# Patient Record
Sex: MALE | Race: WHITE | NOT HISPANIC OR LATINO | Employment: UNEMPLOYED | URBAN - METROPOLITAN AREA
[De-identification: names, ages, dates, MRNs, and addresses within clinical notes are randomized per-mention and may not be internally consistent; named-entity substitution may affect disease eponyms.]

---

## 2021-01-19 RX ORDER — DEXTROAMPHETAMINE SACCHARATE, AMPHETAMINE ASPARTATE MONOHYDRATE, DEXTROAMPHETAMINE SULFATE AND AMPHETAMINE SULFATE 7.5; 7.5; 7.5; 7.5 MG/1; MG/1; MG/1; MG/1
30 CAPSULE, EXTENDED RELEASE ORAL DAILY
COMMUNITY
End: 2021-01-20

## 2021-01-19 RX ORDER — ROPINIROLE 1 MG/1
1 TABLET, FILM COATED ORAL 3 TIMES DAILY
COMMUNITY
End: 2021-01-20

## 2021-01-19 RX ORDER — FLUOXETINE 40 MG/1
40 CAPSULE ORAL DAILY
COMMUNITY
End: 2021-01-20

## 2021-01-19 RX ORDER — DEXTROAMPHETAMINE SACCHARATE, AMPHETAMINE ASPARTATE, DEXTROAMPHETAMINE SULFATE AND AMPHETAMINE SULFATE 2.5; 2.5; 2.5; 2.5 MG/1; MG/1; MG/1; MG/1
10 TABLET ORAL 2 TIMES DAILY
COMMUNITY
End: 2021-01-20

## 2021-01-19 NOTE — PROGRESS NOTES
3  SUBJECTIVE:   CC: Tanvir Munoz is an 21 year old male who presents for preventive health visit.     Patient has been advised of split billing requirements and indicates understanding: Yes  Healthy Habits:    Do you get at least three servings of calcium containing foods daily (dairy, green leafy vegetables, etc.)? yes    Amount of exercise or daily activities, outside of work: 2-3 day(s) per week    Problems taking medications regularly No    Medication side effects: No    Have you had an eye exam in the past two years? yes    Do you see a dentist twice per year? yes    Do you have sleep apnea, excessive snoring or daytime drowsiness?yes - diagnosed pre-sleep apnea, will be trying a mouth guard. Sleep specialty with [facility]    PROBLEMS TO ADD ON...  Fasting    Needing refills and/or labs for:  Depression/Anxiety  Update PHQ/JOSEPH    Additional medications:  Adderall  Adderall XR     Seeing a sleep specialist for mild sleep apnea and RLS  Will be trying out a mouth guard and has been started on Requip    -------------------------------------    Today's PHQ-2 Score:   PHQ-2 ( 1999 Pfizer) 1/19/2021   Q1: Little interest or pleasure in doing things 0   Q2: Feeling down, depressed or hopeless 0   PHQ-2 Score 0       Abuse: Current or Past(Physical, Sexual or Emotional)- No  Do you feel safe in your environment? Yes        Social History     Tobacco Use     Smoking status: Never Smoker     Smokeless tobacco: Never Used   Substance Use Topics     Alcohol use: No     If you drink alcohol do you typically have >3 drinks per day or >7 drinks per week? No                      Last PSA: No results found for: PSA    Reviewed orders with patient. Reviewed health maintenance and updated orders accordingly - Yes  Lab work is in process    Reviewed and updated as needed this visit by clinical staff  Tobacco  Allergies  Meds              Reviewed and updated as needed this visit by Provider                Past Medical History:  "  Diagnosis Date     Pneumonia 6/09    HOSP.2 DAYS        ROS:  CONSTITUTIONAL: NEGATIVE for fever, chills, change in weight  INTEGUMENTARY/SKIN: NEGATIVE for worrisome rashes, moles or lesions  EYES: NEGATIVE for vision changes or irritation  ENT: NEGATIVE for ear, mouth and throat problems  RESP: NEGATIVE for significant cough or SOB  CV: NEGATIVE for chest pain, palpitations or peripheral edema  GI: NEGATIVE for nausea, abdominal pain, heartburn, or change in bowel habits   male: negative for dysuria, hematuria, decreased urinary stream, erectile dysfunction, urethral discharge  MUSCULOSKELETAL: NEGATIVE for significant arthralgias or myalgia  NEURO: NEGATIVE for weakness, dizziness or paresthesias  PSYCHIATRIC: NEGATIVE for changes in mood or affect    OBJECTIVE:   /81   Pulse 55   Temp 97.2  F (36.2  C) (Tympanic)   Resp 16   Ht 1.803 m (5' 10.98\")   Wt 94.5 kg (208 lb 6.4 oz)   SpO2 98%   BMI 29.08 kg/m    EXAM:  GENERAL: healthy, alert and no distress  EYES: Eyes grossly normal to inspection, PERRL and conjunctivae and sclerae normal  HENT: ear canals and TM's normal, nose and mouth without ulcers or lesions  NECK: no adenopathy, no asymmetry, masses, or scars and thyroid normal to palpation  RESP: lungs clear to auscultation - no rales, rhonchi or wheezes  CV: regular rates and rhythm, normal S1 S2, no S3 or S4 and no murmur, click or rub  ABDOMEN: soft, nontender, no hepatosplenomegaly, no masses and bowel sounds normal  MS: no gross musculoskeletal defects noted, no edema  SKIN: no suspicious lesions or rashes  NEURO: Normal strength and tone, mentation intact and speech normal  PSYCH: mentation appears normal, affect normal/bright    Diagnostic Test Results:  Labs reviewed in Epic    ASSESSMENT/PLAN:     1. Encounter for routine adult medical exam with abnormal findings    2. Lipid screening    3. Diabetes mellitus screening    4. Encounter for screening for HIV    5. Need for hepatitis C " "screening test    6. JOSEPH (generalized anxiety disorder)    7. Attention deficit hyperactivity disorder (ADHD), unspecified ADHD type    8. Hyperhidrosis of axilla    9. Heat intolerance    10. Sleep apnea, unspecified type    11. RLS (restless legs syndrome)        1-5) Screenings discussed    6,7) Meds renewed, no changes. Conditions stable per patient. UDS at next OV then annually.    8,9) Drysol renewed. Will check a CBC and TSH as well.     10,11) Currently following sleep specialty.       Patient has been advised of split billing requirements and indicates understanding: Yes  COUNSELING:  Reviewed preventive health counseling, as reflected in patient instructions    Estimated body mass index is 29.08 kg/m  as calculated from the following:    Height as of this encounter: 1.803 m (5' 10.98\").    Weight as of this encounter: 94.5 kg (208 lb 6.4 oz).    He reports that he has never smoked. He has never used smokeless tobacco.      Counseling Resources:  ATP IV Guidelines  Pooled Cohorts Equation Calculator  FRAX Risk Assessment  ICSI Preventive Guidelines  Dietary Guidelines for Americans, 2010  USDA's MyPlate  ASA Prophylaxis  Lung CA Screening    LUZ Collins [facility]  "

## 2021-01-20 ENCOUNTER — OFFICE VISIT (OUTPATIENT)
Dept: FAMILY MEDICINE | Facility: CLINIC | Age: 21
End: 2021-01-20
Payer: COMMERCIAL

## 2021-01-20 VITALS
WEIGHT: 208.4 LBS | SYSTOLIC BLOOD PRESSURE: 131 MMHG | DIASTOLIC BLOOD PRESSURE: 81 MMHG | OXYGEN SATURATION: 98 % | BODY MASS INDEX: 29.18 KG/M2 | HEIGHT: 71 IN | HEART RATE: 55 BPM | TEMPERATURE: 97.2 F | RESPIRATION RATE: 16 BRPM

## 2021-01-20 DIAGNOSIS — G47.30 SLEEP APNEA, UNSPECIFIED TYPE: ICD-10-CM

## 2021-01-20 DIAGNOSIS — F90.9 ATTENTION DEFICIT HYPERACTIVITY DISORDER (ADHD), UNSPECIFIED ADHD TYPE: ICD-10-CM

## 2021-01-20 DIAGNOSIS — Z11.59 NEED FOR HEPATITIS C SCREENING TEST: ICD-10-CM

## 2021-01-20 DIAGNOSIS — Z13.1 DIABETES MELLITUS SCREENING: ICD-10-CM

## 2021-01-20 DIAGNOSIS — Z11.4 ENCOUNTER FOR SCREENING FOR HIV: ICD-10-CM

## 2021-01-20 DIAGNOSIS — Z13.220 LIPID SCREENING: ICD-10-CM

## 2021-01-20 DIAGNOSIS — R68.89 HEAT INTOLERANCE: ICD-10-CM

## 2021-01-20 DIAGNOSIS — F41.1 GAD (GENERALIZED ANXIETY DISORDER): ICD-10-CM

## 2021-01-20 DIAGNOSIS — L74.510 HYPERHIDROSIS OF AXILLA: ICD-10-CM

## 2021-01-20 DIAGNOSIS — Z00.01 ENCOUNTER FOR ROUTINE ADULT MEDICAL EXAM WITH ABNORMAL FINDINGS: Primary | ICD-10-CM

## 2021-01-20 DIAGNOSIS — G25.81 RLS (RESTLESS LEGS SYNDROME): ICD-10-CM

## 2021-01-20 LAB
BASOPHILS # BLD AUTO: 0 10E9/L (ref 0–0.2)
BASOPHILS NFR BLD AUTO: 0.4 %
CHOLEST SERPL-MCNC: 165 MG/DL
DIFFERENTIAL METHOD BLD: NORMAL
EOSINOPHIL # BLD AUTO: 0.1 10E9/L (ref 0–0.7)
EOSINOPHIL NFR BLD AUTO: 2 %
ERYTHROCYTE [DISTWIDTH] IN BLOOD BY AUTOMATED COUNT: 12.6 % (ref 10–15)
GLUCOSE SERPL-MCNC: 90 MG/DL (ref 70–99)
HCT VFR BLD AUTO: 48.6 % (ref 40–53)
HCV AB SERPL QL IA: NONREACTIVE
HDLC SERPL-MCNC: 56 MG/DL
HGB BLD-MCNC: 16.2 G/DL (ref 13.3–17.7)
HIV 1+2 AB+HIV1 P24 AG SERPL QL IA: NONREACTIVE
LDLC SERPL CALC-MCNC: 93 MG/DL
LYMPHOCYTES # BLD AUTO: 2 10E9/L (ref 0.8–5.3)
LYMPHOCYTES NFR BLD AUTO: 43.4 %
MCH RBC QN AUTO: 30 PG (ref 26.5–33)
MCHC RBC AUTO-ENTMCNC: 33.3 G/DL (ref 31.5–36.5)
MCV RBC AUTO: 90 FL (ref 78–100)
MONOCYTES # BLD AUTO: 0.4 10E9/L (ref 0–1.3)
MONOCYTES NFR BLD AUTO: 7.9 %
NEUTROPHILS # BLD AUTO: 2.1 10E9/L (ref 1.6–8.3)
NEUTROPHILS NFR BLD AUTO: 46.3 %
NONHDLC SERPL-MCNC: 109 MG/DL
PLATELET # BLD AUTO: 178 10E9/L (ref 150–450)
RBC # BLD AUTO: 5.4 10E12/L (ref 4.4–5.9)
TRIGL SERPL-MCNC: 80 MG/DL
TSH SERPL DL<=0.005 MIU/L-ACNC: 1.18 MU/L (ref 0.4–4)
WBC # BLD AUTO: 4.6 10E9/L (ref 4–11)

## 2021-01-20 PROCEDURE — 99385 PREV VISIT NEW AGE 18-39: CPT | Mod: 25 | Performed by: PHYSICIAN ASSISTANT

## 2021-01-20 PROCEDURE — 82947 ASSAY GLUCOSE BLOOD QUANT: CPT | Performed by: PHYSICIAN ASSISTANT

## 2021-01-20 PROCEDURE — 36415 COLL VENOUS BLD VENIPUNCTURE: CPT | Performed by: PHYSICIAN ASSISTANT

## 2021-01-20 PROCEDURE — 99213 OFFICE O/P EST LOW 20 MIN: CPT | Mod: 25 | Performed by: PHYSICIAN ASSISTANT

## 2021-01-20 PROCEDURE — 86803 HEPATITIS C AB TEST: CPT | Performed by: PHYSICIAN ASSISTANT

## 2021-01-20 PROCEDURE — 85025 COMPLETE CBC W/AUTO DIFF WBC: CPT | Performed by: PHYSICIAN ASSISTANT

## 2021-01-20 PROCEDURE — 80061 LIPID PANEL: CPT | Performed by: PHYSICIAN ASSISTANT

## 2021-01-20 PROCEDURE — 90686 IIV4 VACC NO PRSV 0.5 ML IM: CPT | Performed by: PHYSICIAN ASSISTANT

## 2021-01-20 PROCEDURE — 90471 IMMUNIZATION ADMIN: CPT | Performed by: PHYSICIAN ASSISTANT

## 2021-01-20 PROCEDURE — 84443 ASSAY THYROID STIM HORMONE: CPT | Performed by: PHYSICIAN ASSISTANT

## 2021-01-20 PROCEDURE — 87389 HIV-1 AG W/HIV-1&-2 AB AG IA: CPT | Performed by: PHYSICIAN ASSISTANT

## 2021-01-20 RX ORDER — DEXTROAMPHETAMINE SACCHARATE, AMPHETAMINE ASPARTATE MONOHYDRATE, DEXTROAMPHETAMINE SULFATE AND AMPHETAMINE SULFATE 7.5; 7.5; 7.5; 7.5 MG/1; MG/1; MG/1; MG/1
30 CAPSULE, EXTENDED RELEASE ORAL DAILY
Qty: 16 CAPSULE | Refills: 0 | Status: SHIPPED | OUTPATIENT
Start: 2021-03-17 | End: 2021-07-07

## 2021-01-20 RX ORDER — DEXTROAMPHETAMINE SACCHARATE, AMPHETAMINE ASPARTATE MONOHYDRATE, DEXTROAMPHETAMINE SULFATE AND AMPHETAMINE SULFATE 7.5; 7.5; 7.5; 7.5 MG/1; MG/1; MG/1; MG/1
30 CAPSULE, EXTENDED RELEASE ORAL DAILY
Qty: 16 CAPSULE | Refills: 0 | Status: SHIPPED | OUTPATIENT
Start: 2021-01-20 | End: 2021-05-26

## 2021-01-20 RX ORDER — DEXTROAMPHETAMINE SACCHARATE, AMPHETAMINE ASPARTATE, DEXTROAMPHETAMINE SULFATE AND AMPHETAMINE SULFATE 2.5; 2.5; 2.5; 2.5 MG/1; MG/1; MG/1; MG/1
10 TABLET ORAL DAILY
Qty: 12 TABLET | Refills: 0 | Status: SHIPPED | OUTPATIENT
Start: 2021-01-20 | End: 2021-05-26

## 2021-01-20 RX ORDER — DEXTROAMPHETAMINE SACCHARATE, AMPHETAMINE ASPARTATE MONOHYDRATE, DEXTROAMPHETAMINE SULFATE AND AMPHETAMINE SULFATE 7.5; 7.5; 7.5; 7.5 MG/1; MG/1; MG/1; MG/1
30 CAPSULE, EXTENDED RELEASE ORAL DAILY
Qty: 16 CAPSULE | Refills: 0 | Status: SHIPPED | OUTPATIENT
Start: 2021-02-17 | End: 2021-05-26

## 2021-01-20 RX ORDER — ROPINIROLE 1 MG/1
1 TABLET, FILM COATED ORAL EVERY EVENING
COMMUNITY
Start: 2021-01-20 | End: 2022-08-22

## 2021-01-20 RX ORDER — DEXTROAMPHETAMINE SACCHARATE, AMPHETAMINE ASPARTATE, DEXTROAMPHETAMINE SULFATE AND AMPHETAMINE SULFATE 2.5; 2.5; 2.5; 2.5 MG/1; MG/1; MG/1; MG/1
10 TABLET ORAL DAILY
Qty: 12 TABLET | Refills: 0 | Status: SHIPPED | OUTPATIENT
Start: 2021-03-17 | End: 2021-07-07

## 2021-01-20 RX ORDER — FLUOXETINE 40 MG/1
40 CAPSULE ORAL DAILY
Qty: 90 CAPSULE | Refills: 3 | Status: SHIPPED | OUTPATIENT
Start: 2021-01-20 | End: 2022-03-15

## 2021-01-20 RX ORDER — DEXTROAMPHETAMINE SACCHARATE, AMPHETAMINE ASPARTATE, DEXTROAMPHETAMINE SULFATE AND AMPHETAMINE SULFATE 2.5; 2.5; 2.5; 2.5 MG/1; MG/1; MG/1; MG/1
10 TABLET ORAL DAILY
Qty: 12 TABLET | Refills: 0 | Status: SHIPPED | OUTPATIENT
Start: 2021-02-17 | End: 2021-05-26

## 2021-01-20 ASSESSMENT — MIFFLIN-ST. JEOR: SCORE: 1977.17

## 2021-01-20 NOTE — LETTER
[facility]    [address] [place] [address]  [phone]                                   January 21, 2021    Tanvir Munoz  1960 [address] [place] [place] [address]        Dear Tanvir,    Your blood count and thyroid levels are normal.   Your blood sugar is normal - no signs of diabetes.   Your LDL (bad) cholesterol is <160 and therefore at goal - no signs of high cholesterol.   The routine viral screenings are negative.     Please call me with any questions or concerns.     Results for orders placed or performed in visit on 01/20/21   Lipid panel reflex to direct LDL Fasting     Status: None   Result Value Ref Range    Cholesterol 165 <200 mg/dL    Triglycerides 80 <150 mg/dL    HDL Cholesterol 56 >39 mg/dL    LDL Cholesterol Calculated 93 <100 mg/dL    Non HDL Cholesterol 109 <130 mg/dL   Glucose     Status: None   Result Value Ref Range    Glucose 90 70 - 99 mg/dL   Hepatitis C Screen Reflex to HCV RNA Quant and Genotype     Status: None   Result Value Ref Range    Hepatitis C Antibody Nonreactive NR^Nonreactive   HIV Antigen Antibody Combo     Status: None   Result Value Ref Range    HIV Antigen Antibody Combo Nonreactive NR^Nonreactive       TSH with free T4 reflex     Status: None   Result Value Ref Range    TSH 1.18 0.40 - 4.00 mU/L   CBC with platelets differential     Status: None   Result Value Ref Range    WBC 4.6 4.0 - 11.0 10e9/L    RBC Count 5.40 4.4 - 5.9 10e12/L    Hemoglobin 16.2 13.3 - 17.7 g/dL    Hematocrit 48.6 40.0 - 53.0 %    MCV 90 78 - 100 fl    MCH 30.0 26.5 - 33.0 pg    MCHC 33.3 31.5 - 36.5 g/dL    RDW 12.6 10.0 - 15.0 %    Platelet Count 178 150 - 450 10e9/L    % Neutrophils 46.3 %    % Lymphocytes 43.4 %    % Monocytes 7.9 %    % Eosinophils 2.0 %    % Basophils 0.4 %    Absolute Neutrophil 2.1 1.6 - 8.3 10e9/L    Absolute Lymphocytes 2.0 0.8 - 5.3 10e9/L    Absolute Monocytes 0.4 0.0 - 1.3 10e9/L    Absolute Eosinophils 0.1 0.0 - 0.7 10e9/L    Absolute Basophils  0.0 0.0 - 0.2 10e9/L    Diff Method Automated Method        If you have any questions please call the clinic at [phone]    Sincerely,    Marilyn Merchant PA-C  bmd

## 2021-01-21 NOTE — RESULT ENCOUNTER NOTE
Please send the following letter to the patient:    Tanvir,    Your blood count and thyroid levels are normal.  Your blood sugar is normal - no signs of diabetes.  Your LDL (bad) cholesterol is <160 and therefore at goal - no signs of high cholesterol.   The routine viral screenings are negative.     Please call me with any questions or concerns.          Marilyn Merchant PA-C

## 2021-05-24 DIAGNOSIS — F90.9 ATTENTION DEFICIT HYPERACTIVITY DISORDER (ADHD), UNSPECIFIED ADHD TYPE: ICD-10-CM

## 2021-05-26 RX ORDER — DEXTROAMPHETAMINE SACCHARATE, AMPHETAMINE ASPARTATE MONOHYDRATE, DEXTROAMPHETAMINE SULFATE AND AMPHETAMINE SULFATE 7.5; 7.5; 7.5; 7.5 MG/1; MG/1; MG/1; MG/1
30 CAPSULE, EXTENDED RELEASE ORAL DAILY
Qty: 16 CAPSULE | Refills: 0 | Status: SHIPPED | OUTPATIENT
Start: 2021-05-26 | End: 2021-06-22

## 2021-05-26 RX ORDER — DEXTROAMPHETAMINE SACCHARATE, AMPHETAMINE ASPARTATE, DEXTROAMPHETAMINE SULFATE AND AMPHETAMINE SULFATE 2.5; 2.5; 2.5; 2.5 MG/1; MG/1; MG/1; MG/1
10 TABLET ORAL DAILY
Qty: 12 TABLET | Refills: 0 | Status: SHIPPED | OUTPATIENT
Start: 2021-05-26 | End: 2021-06-22

## 2021-06-22 DIAGNOSIS — F90.9 ATTENTION DEFICIT HYPERACTIVITY DISORDER (ADHD), UNSPECIFIED ADHD TYPE: ICD-10-CM

## 2021-07-07 ENCOUNTER — VIRTUAL VISIT (OUTPATIENT)
Dept: FAMILY MEDICINE | Facility: CLINIC | Age: 21
End: 2021-07-07
Payer: COMMERCIAL

## 2021-07-07 DIAGNOSIS — F90.9 ATTENTION DEFICIT HYPERACTIVITY DISORDER (ADHD), UNSPECIFIED ADHD TYPE: ICD-10-CM

## 2021-07-07 DIAGNOSIS — G25.81 RLS (RESTLESS LEGS SYNDROME): ICD-10-CM

## 2021-07-07 DIAGNOSIS — F41.1 GAD (GENERALIZED ANXIETY DISORDER): Primary | ICD-10-CM

## 2021-07-07 PROCEDURE — 99214 OFFICE O/P EST MOD 30 MIN: CPT | Mod: 95 | Performed by: PHYSICIAN ASSISTANT

## 2021-07-07 RX ORDER — DEXTROAMPHETAMINE SACCHARATE, AMPHETAMINE ASPARTATE MONOHYDRATE, DEXTROAMPHETAMINE SULFATE AND AMPHETAMINE SULFATE 7.5; 7.5; 7.5; 7.5 MG/1; MG/1; MG/1; MG/1
30 CAPSULE, EXTENDED RELEASE ORAL DAILY
Qty: 16 CAPSULE | Refills: 0 | Status: SHIPPED | OUTPATIENT
Start: 2021-07-23 | End: 2021-12-08

## 2021-07-07 RX ORDER — DEXTROAMPHETAMINE SACCHARATE, AMPHETAMINE ASPARTATE, DEXTROAMPHETAMINE SULFATE AND AMPHETAMINE SULFATE 2.5; 2.5; 2.5; 2.5 MG/1; MG/1; MG/1; MG/1
10 TABLET ORAL DAILY
Qty: 12 TABLET | Refills: 0 | Status: SHIPPED | OUTPATIENT
Start: 2021-09-21 | End: 2021-12-08

## 2021-07-07 RX ORDER — DEXTROAMPHETAMINE SACCHARATE, AMPHETAMINE ASPARTATE MONOHYDRATE, DEXTROAMPHETAMINE SULFATE AND AMPHETAMINE SULFATE 7.5; 7.5; 7.5; 7.5 MG/1; MG/1; MG/1; MG/1
30 CAPSULE, EXTENDED RELEASE ORAL DAILY
Qty: 16 CAPSULE | Refills: 0 | Status: SHIPPED | OUTPATIENT
Start: 2021-08-22 | End: 2021-12-08

## 2021-07-07 RX ORDER — DEXTROAMPHETAMINE SACCHARATE, AMPHETAMINE ASPARTATE MONOHYDRATE, DEXTROAMPHETAMINE SULFATE AND AMPHETAMINE SULFATE 7.5; 7.5; 7.5; 7.5 MG/1; MG/1; MG/1; MG/1
30 CAPSULE, EXTENDED RELEASE ORAL DAILY
Qty: 16 CAPSULE | Refills: 0 | Status: SHIPPED | OUTPATIENT
Start: 2021-09-21 | End: 2021-12-08

## 2021-07-07 RX ORDER — DEXTROAMPHETAMINE SACCHARATE, AMPHETAMINE ASPARTATE, DEXTROAMPHETAMINE SULFATE AND AMPHETAMINE SULFATE 2.5; 2.5; 2.5; 2.5 MG/1; MG/1; MG/1; MG/1
10 TABLET ORAL DAILY
Qty: 12 TABLET | Refills: 0 | Status: SHIPPED | OUTPATIENT
Start: 2021-08-22 | End: 2021-12-08

## 2021-07-07 RX ORDER — DEXTROAMPHETAMINE SACCHARATE, AMPHETAMINE ASPARTATE, DEXTROAMPHETAMINE SULFATE AND AMPHETAMINE SULFATE 2.5; 2.5; 2.5; 2.5 MG/1; MG/1; MG/1; MG/1
10 TABLET ORAL DAILY
Qty: 12 TABLET | Refills: 0 | Status: SHIPPED | OUTPATIENT
Start: 2021-07-23 | End: 2021-12-08

## 2021-07-07 ASSESSMENT — ANXIETY QUESTIONNAIRES
5. BEING SO RESTLESS THAT IT IS HARD TO SIT STILL: SEVERAL DAYS
IF YOU CHECKED OFF ANY PROBLEMS ON THIS QUESTIONNAIRE, HOW DIFFICULT HAVE THESE PROBLEMS MADE IT FOR YOU TO DO YOUR WORK, TAKE CARE OF THINGS AT HOME, OR GET ALONG WITH OTHER PEOPLE: NOT DIFFICULT AT ALL
1. FEELING NERVOUS, ANXIOUS, OR ON EDGE: SEVERAL DAYS
7. FEELING AFRAID AS IF SOMETHING AWFUL MIGHT HAPPEN: NOT AT ALL
3. WORRYING TOO MUCH ABOUT DIFFERENT THINGS: SEVERAL DAYS
6. BECOMING EASILY ANNOYED OR IRRITABLE: NOT AT ALL
2. NOT BEING ABLE TO STOP OR CONTROL WORRYING: SEVERAL DAYS
GAD7 TOTAL SCORE: 6

## 2021-07-07 ASSESSMENT — PATIENT HEALTH QUESTIONNAIRE - PHQ9: 5. POOR APPETITE OR OVEREATING: MORE THAN HALF THE DAYS

## 2021-07-07 NOTE — PROGRESS NOTES
"Paul is a 21 year old who is being evaluated via a billable video visit.      How would you like to obtain your AVS? MyChart  If the video visit is dropped, the invitation should be resent by: Text to cell phone: [phone]  Will anyone else be joining your video visit? No      Video Start Time: 9:35am    Assessment & Plan     1. JOSEPH (generalized anxiety disorder)    2. Attention deficit hyperactivity disorder (ADHD), unspecified ADHD type    3. RLS (restless legs syndrome)        1) JOSEPH score looks good today. A letter was completed for a  animal.    2) ADHD under good control. Meds renewed, no change. Follow up e-visit in 6 months.     3) Will recheck his ferritin level. If improved, may have him stop iron supplementation and recheck in 6 months.       Ordering of each unique test  Prescription drug management         BMI:   Estimated body mass index is 29.08 kg/m  as calculated from the following:    Height as of 1/20/21: 1.803 m (5' 10.98\").    Weight as of 1/20/21: 94.5 kg (208 lb 6.4 oz).       Return in about 6 months (around 1/7/2022) for ADHD follow up, an e-visit.    Marilyn Merchant PA-C  [facility] Miller is a 21 year old who presents for the following health issues     HPI      Medication Followup of amphetamine-dextroamphetamine (ADDERALL 10mg) and (ADDERALL XR 30mg)    Taking Medication as prescribed: yes    Side Effects:  None    Medication Helping Symptoms:  yes     Anxiety Follow-Up    How are you doing with your anxiety since your last visit? No change    Are you having other symptoms that might be associated with anxiety? No    Have you had a significant life event? No     Are you feeling depressed? No    Do you have any concerns with your use of alcohol or other drugs? No     Has had dogs growing up and he has noted having a pet helps with his anxiety  Alleviates anxiety  He is thinking about getting a pet of his own when he moves in the " future      Social History     Tobacco Use     Smoking status: Never Smoker     Smokeless tobacco: Never Used   Substance Use Topics     Alcohol use: No     Drug use: No     JOSEPH-7 SCORE 7/7/2021   Total Score 6     No flowsheet data found.  JOSEPH-7  7/7/2021   1. Feeling nervous, anxious, or on edge 1   2. Not being able to stop or control worrying 1   3. Worrying too much about different things 1   4. Trouble relaxing 2   5. Being so restless that it is hard to sit still 1   6. Becoming easily annoyed or irritable 0   7. Feeling afraid, as if something awful might happen 0   JOSEPH-7 Total Score 6   If you checked any problems, how difficult have they made it for you to do your work, take care of things at home, or get along with other people? Not difficult at all       Review of Systems   Constitutional, and psych systems are negative, except as otherwise noted.      Objective           Vitals:  No vitals were obtained today due to virtual visit.    Physical Exam   GENERAL: Healthy, alert and no distress  EYES: Eyes grossly normal to inspection.  No discharge or erythema, or obvious scleral/conjunctival abnormalities.  RESP: No audible wheeze, cough, or visible cyanosis.  No visible retractions or increased work of breathing.    SKIN: Visible skin clear. No significant rash, abnormal pigmentation or lesions.  NEURO: Cranial nerves grossly intact.  Mentation and speech appropriate for age.  PSYCH: Mentation appears normal, affect normal/bright, judgement and insight intact, normal speech and appearance well-groomed.          Video-Visit Details    Type of service:  Video Visit    Video End Time: 9:42am, 7 mins    Originating Location (pt. Location): Home    Distant Location (provider location):  [facility]     Platform used for Video Visit: [email or web address]

## 2021-07-07 NOTE — LETTER
[facility]  [address] [place] [address]  Phone: [phone]    July 7, 2021        Tanvir Munoz  1960 [address] [place] [address]          To whom it may concern:    RE: Tanvir Munoz    Patient was seen and treated today at our clinic.  Paul is under my care and I am currently treating him for anxiety. It is my professional opinion that the presence of a  animal is necessary to treat and alleviate the symptoms associated with his anxiety.      Please contact me for questions or concerns.      Sincerely,      Marilyn Merchant PA-C

## 2021-07-08 ASSESSMENT — ANXIETY QUESTIONNAIRES: GAD7 TOTAL SCORE: 6

## 2021-09-21 ENCOUNTER — LAB (OUTPATIENT)
Dept: LAB | Facility: CLINIC | Age: 21
End: 2021-09-21
Payer: COMMERCIAL

## 2021-09-21 DIAGNOSIS — G25.81 RLS (RESTLESS LEGS SYNDROME): ICD-10-CM

## 2021-09-21 LAB — FERRITIN SERPL-MCNC: 105 NG/ML (ref 26–388)

## 2021-09-21 PROCEDURE — 36415 COLL VENOUS BLD VENIPUNCTURE: CPT

## 2021-09-21 PROCEDURE — 82728 ASSAY OF FERRITIN: CPT

## 2021-09-26 ENCOUNTER — HEALTH MAINTENANCE LETTER (OUTPATIENT)
Age: 21
End: 2021-09-26

## 2021-12-08 DIAGNOSIS — F90.9 ATTENTION DEFICIT HYPERACTIVITY DISORDER (ADHD), UNSPECIFIED ADHD TYPE: ICD-10-CM

## 2021-12-08 NOTE — TELEPHONE ENCOUNTER
AMPHETAMINE-DEXTROAMPHETAMINE 15 MG   INSURANCE WILL ONLY COVER 2 PER DAY.    [facility] -[phone]

## 2021-12-09 NOTE — TELEPHONE ENCOUNTER
Please let patient know insurance will cover #60 per month. We can either try that, or he can pay out of pocket for the extra #12 tabs per month.  Otherwise we can see if Ritalin LA is covered (lasts for 6-8 hours). Can always use a short acting Adderall later in the afternoon if not lasting long enough    He's due for his 6mo ADHD follow up in the next few weeks so maybe it would be best to discuss during his follow up

## 2021-12-10 RX ORDER — DEXTROAMPHETAMINE SACCHARATE, AMPHETAMINE ASPARTATE, DEXTROAMPHETAMINE SULFATE AND AMPHETAMINE SULFATE 3.75; 3.75; 3.75; 3.75 MG/1; MG/1; MG/1; MG/1
15 TABLET ORAL 2 TIMES DAILY PRN
Qty: 60 TABLET | Refills: 0 | Status: CANCELLED | OUTPATIENT
Start: 2021-12-10

## 2021-12-17 NOTE — TELEPHONE ENCOUNTER
Patient read my chart.  Will close encounter    12/16/21 11:19 AM  Paul, we tried calling you last week but do not see that you have responded.  Marilyn had a message she wanted to pass on to you.  Message below.  Please make sure you call [phone] for follow up.     Marilyn Merchant PA-C     EK    8:21 AM  Note  Please let patient know insurance will cover #60 per month. We can either try that, or he can pay out of pocket for the extra #12 tabs per month.  Otherwise we can see if Ritalin LA is covered (lasts for 6-8 hours). Can always use a short acting Adderall later in the afternoon if not lasting long enough     He's due for his 6mo ADHD follow up in the next few weeks so maybe it would be best to discuss during his follow up         Last read by Tanvir Munoz at 11:46 AM on 12/16/2021.

## 2021-12-30 ENCOUNTER — VIRTUAL VISIT (OUTPATIENT)
Dept: FAMILY MEDICINE | Facility: CLINIC | Age: 21
End: 2021-12-30
Payer: COMMERCIAL

## 2021-12-30 DIAGNOSIS — F90.9 ATTENTION DEFICIT HYPERACTIVITY DISORDER (ADHD), UNSPECIFIED ADHD TYPE: Primary | ICD-10-CM

## 2021-12-30 PROCEDURE — 99213 OFFICE O/P EST LOW 20 MIN: CPT | Mod: 95 | Performed by: PHYSICIAN ASSISTANT

## 2021-12-30 ASSESSMENT — ANXIETY QUESTIONNAIRES
7. FEELING AFRAID AS IF SOMETHING AWFUL MIGHT HAPPEN: NOT AT ALL
1. FEELING NERVOUS, ANXIOUS, OR ON EDGE: SEVERAL DAYS
3. WORRYING TOO MUCH ABOUT DIFFERENT THINGS: SEVERAL DAYS
IF YOU CHECKED OFF ANY PROBLEMS ON THIS QUESTIONNAIRE, HOW DIFFICULT HAVE THESE PROBLEMS MADE IT FOR YOU TO DO YOUR WORK, TAKE CARE OF THINGS AT HOME, OR GET ALONG WITH OTHER PEOPLE: SOMEWHAT DIFFICULT
2. NOT BEING ABLE TO STOP OR CONTROL WORRYING: SEVERAL DAYS
5. BEING SO RESTLESS THAT IT IS HARD TO SIT STILL: NOT AT ALL
GAD7 TOTAL SCORE: 4
6. BECOMING EASILY ANNOYED OR IRRITABLE: NOT AT ALL

## 2021-12-30 ASSESSMENT — PATIENT HEALTH QUESTIONNAIRE - PHQ9
SUM OF ALL RESPONSES TO PHQ QUESTIONS 1-9: 4
5. POOR APPETITE OR OVEREATING: SEVERAL DAYS

## 2021-12-30 NOTE — PROGRESS NOTES
"Paul is a 21 year old who is being evaluated via a billable video visit.      How would you like to obtain your AVS? MyChart  If the video visit is dropped, the invitation should be resent by: Text to cell phone: [phone]  Will anyone else be joining your video visit? No      Video Start Time: 7:51am    Assessment & Plan     1. Attention deficit hyperactivity disorder (ADHD), unspecified ADHD type        Prefers to use IR formulation, but because it only works for 3-4 hours he needs to take up to 3 tabs some days of the week. He will trial 15mg next month when his spring semester starts. Form filled out, will fax back for patient.       Prescription drug management         BMI:   Estimated body mass index is 29.08 kg/m  as calculated from the following:    Height as of 1/20/21: 1.803 m (5' 10.98\").    Weight as of 1/20/21: 94.5 kg (208 lb 6.4 oz).       Return in about 6 months (around 6/30/2022) for ADHD follow up, a med check.    Marilyn Mrechant PA-C  [facility]    Belkys Miller is a 21 year old who presents for the following health issues     HPI       Medication Followup of amphetamine-dextroamphetamine (ADDERALL) 15 MG tablet    Taking Medication as prescribed: yes    Side Effects:  None    Medication Helping Symptoms:  Yes    Using IR dosing is more convenient  10mg lasted about 3 hours   Hasn't tried the 15's yet bc of college break, but will start school again 1/10/22  Does need three doses some days, ~4 days per week - needs 9-10 hours of focus for the day         Review of Systems   Constitutional, psych systems are negative, except as otherwise noted.      Objective           Vitals:  No vitals were obtained today due to virtual visit.    Physical Exam   GENERAL: Healthy, alert and no distress  EYES: Eyes grossly normal to inspection.  No discharge or erythema, or obvious scleral/conjunctival abnormalities.  RESP: No audible wheeze, cough, or visible cyanosis.  No visible " retractions or increased work of breathing.    SKIN: Visible skin clear. No significant rash, abnormal pigmentation or lesions.  NEURO: Cranial nerves grossly intact.  Mentation and speech appropriate for age.  PSYCH: Mentation appears normal, affect normal/bright, judgement and insight intact, normal speech and appearance well-groomed.          Video-Visit Details    Type of service:  Video Visit    Video End Time: 7:59am, 8 mins    Originating Location (pt. Location): Home    Distant Location (provider location):  [facility]     Platform used for Video Visit: Izaiah

## 2021-12-31 ASSESSMENT — ANXIETY QUESTIONNAIRES: GAD7 TOTAL SCORE: 4

## 2022-03-13 ENCOUNTER — HEALTH MAINTENANCE LETTER (OUTPATIENT)
Age: 22
End: 2022-03-13

## 2022-03-14 ENCOUNTER — MYC MEDICAL ADVICE (OUTPATIENT)
Dept: FAMILY MEDICINE | Facility: CLINIC | Age: 22
End: 2022-03-14
Payer: COMMERCIAL

## 2022-03-14 DIAGNOSIS — F90.9 ATTENTION DEFICIT HYPERACTIVITY DISORDER (ADHD), UNSPECIFIED ADHD TYPE: ICD-10-CM

## 2022-03-14 DIAGNOSIS — F41.1 GAD (GENERALIZED ANXIETY DISORDER): ICD-10-CM

## 2022-03-15 RX ORDER — FLUOXETINE 40 MG/1
CAPSULE ORAL
Qty: 90 CAPSULE | Refills: 0 | Status: SHIPPED | OUTPATIENT
Start: 2022-03-15 | End: 2022-06-12

## 2022-03-15 NOTE — TELEPHONE ENCOUNTER
Prescription approved per [facility] Refill Protocol.    JOSEPH-7 SCORE 7/7/2021 12/30/2021   Total Score 6 4

## 2022-03-16 RX ORDER — DEXTROAMPHETAMINE SACCHARATE, AMPHETAMINE ASPARTATE, DEXTROAMPHETAMINE SULFATE AND AMPHETAMINE SULFATE 3.75; 3.75; 3.75; 3.75 MG/1; MG/1; MG/1; MG/1
15 TABLET ORAL 3 TIMES DAILY PRN
Qty: 72 TABLET | Refills: 0 | Status: SHIPPED | OUTPATIENT
Start: 2022-03-16 | End: 2022-04-07

## 2022-03-16 RX ORDER — DEXTROAMPHETAMINE SACCHARATE, AMPHETAMINE ASPARTATE, DEXTROAMPHETAMINE SULFATE AND AMPHETAMINE SULFATE 7.5; 7.5; 7.5; 7.5 MG/1; MG/1; MG/1; MG/1
15 TABLET ORAL 3 TIMES DAILY PRN
Qty: 36 TABLET | Refills: 0 | Status: SHIPPED | OUTPATIENT
Start: 2022-03-16 | End: 2022-08-22

## 2022-03-16 RX ORDER — DEXTROAMPHETAMINE SACCHARATE, AMPHETAMINE ASPARTATE, DEXTROAMPHETAMINE SULFATE AND AMPHETAMINE SULFATE 7.5; 7.5; 7.5; 7.5 MG/1; MG/1; MG/1; MG/1
15 TABLET ORAL 3 TIMES DAILY PRN
Qty: 36 TABLET | Refills: 0 | Status: SHIPPED | OUTPATIENT
Start: 2022-04-13 | End: 2022-04-07

## 2022-03-16 RX ORDER — DEXTROAMPHETAMINE SACCHARATE, AMPHETAMINE ASPARTATE, DEXTROAMPHETAMINE SULFATE AND AMPHETAMINE SULFATE 7.5; 7.5; 7.5; 7.5 MG/1; MG/1; MG/1; MG/1
15 TABLET ORAL 3 TIMES DAILY PRN
Qty: 36 TABLET | Refills: 0 | Status: SHIPPED | OUTPATIENT
Start: 2022-05-13 | End: 2022-04-07

## 2022-03-16 NOTE — TELEPHONE ENCOUNTER
Just FYI - I don't log into my inbasket after I leave the clinic for the day. This includes Thursdays as well (unless I'm waiting for a specific lab result for a patient)

## 2022-05-09 ENCOUNTER — ALLIED HEALTH/NURSE VISIT (OUTPATIENT)
Dept: FAMILY MEDICINE | Facility: CLINIC | Age: 22
End: 2022-05-09
Payer: COMMERCIAL

## 2022-05-09 DIAGNOSIS — Z23 NEED FOR TDAP VACCINATION: Primary | ICD-10-CM

## 2022-05-09 PROCEDURE — 90715 TDAP VACCINE 7 YRS/> IM: CPT

## 2022-05-09 PROCEDURE — 99207 PR NO CHARGE NURSE ONLY: CPT

## 2022-05-09 PROCEDURE — 90471 IMMUNIZATION ADMIN: CPT

## 2022-06-11 DIAGNOSIS — F41.1 GAD (GENERALIZED ANXIETY DISORDER): ICD-10-CM

## 2022-06-12 RX ORDER — FLUOXETINE 40 MG/1
40 CAPSULE ORAL DAILY
Qty: 90 CAPSULE | Refills: 0 | Status: SHIPPED | OUTPATIENT
Start: 2022-06-12 | End: 2022-08-22

## 2022-06-12 NOTE — TELEPHONE ENCOUNTER
Medication is being filled for 1 time refill only due to:  Patient needs to be seen because need recheck.   Sent mychart to schedule.  Pina Parkinson RN      JOSEPH-7 SCORE 7/7/2021 12/30/2021   Total Score 6 4

## 2022-07-16 ENCOUNTER — MYC MEDICAL ADVICE (OUTPATIENT)
Dept: FAMILY MEDICINE | Facility: CLINIC | Age: 22
End: 2022-07-16

## 2022-07-20 NOTE — TELEPHONE ENCOUNTER
Called patient. No answer. Left message to call back.    Tori Fowler, INOCENTE on 7/20/2022 at 1:10 PM

## 2022-07-27 NOTE — TELEPHONE ENCOUNTER
Called and spoke with the patient. He stated that he already has an appointment scheduled. Closing encounter.    Tori Fowler CMA on 7/27/2022 at 2:32 PM

## 2022-08-22 ENCOUNTER — OFFICE VISIT (OUTPATIENT)
Dept: FAMILY MEDICINE | Facility: CLINIC | Age: 22
End: 2022-08-22
Payer: COMMERCIAL

## 2022-08-22 VITALS
RESPIRATION RATE: 20 BRPM | OXYGEN SATURATION: 99 % | DIASTOLIC BLOOD PRESSURE: 79 MMHG | HEART RATE: 58 BPM | WEIGHT: 219.8 LBS | TEMPERATURE: 96.9 F | HEIGHT: 71 IN | BODY MASS INDEX: 30.77 KG/M2 | SYSTOLIC BLOOD PRESSURE: 121 MMHG

## 2022-08-22 DIAGNOSIS — F90.9 ATTENTION DEFICIT HYPERACTIVITY DISORDER (ADHD), UNSPECIFIED ADHD TYPE: Primary | ICD-10-CM

## 2022-08-22 DIAGNOSIS — Z71.89 ADVANCE CARE PLANNING: ICD-10-CM

## 2022-08-22 DIAGNOSIS — Z79.899 ENCOUNTER FOR LONG-TERM (CURRENT) USE OF MEDICATIONS: ICD-10-CM

## 2022-08-22 DIAGNOSIS — F41.1 GAD (GENERALIZED ANXIETY DISORDER): ICD-10-CM

## 2022-08-22 LAB
AMPHETAMINES UR QL: NOT DETECTED
BARBITURATES UR QL SCN: NOT DETECTED
BENZODIAZ UR QL SCN: NOT DETECTED
BUPRENORPHINE UR QL: NOT DETECTED
CANNABINOIDS UR QL: DETECTED
COCAINE UR QL SCN: NOT DETECTED
D-METHAMPHET UR QL: NOT DETECTED
METHADONE UR QL SCN: NOT DETECTED
OPIATES UR QL SCN: NOT DETECTED
OXYCODONE UR QL SCN: NOT DETECTED
PCP UR QL SCN: NOT DETECTED
PROPOXYPH UR QL: NOT DETECTED
TRICYCLICS UR QL SCN: NOT DETECTED

## 2022-08-22 PROCEDURE — 99214 OFFICE O/P EST MOD 30 MIN: CPT | Performed by: PHYSICIAN ASSISTANT

## 2022-08-22 PROCEDURE — 80306 DRUG TEST PRSMV INSTRMNT: CPT | Performed by: PHYSICIAN ASSISTANT

## 2022-08-22 RX ORDER — DEXTROAMPHETAMINE SACCHARATE, AMPHETAMINE ASPARTATE, DEXTROAMPHETAMINE SULFATE AND AMPHETAMINE SULFATE 7.5; 7.5; 7.5; 7.5 MG/1; MG/1; MG/1; MG/1
15 TABLET ORAL 3 TIMES DAILY PRN
Qty: 108 TABLET | Refills: 0 | Status: SHIPPED | OUTPATIENT
Start: 2022-08-22 | End: 2022-11-02

## 2022-08-22 RX ORDER — FLUOXETINE 40 MG/1
40 CAPSULE ORAL DAILY
Qty: 90 CAPSULE | Refills: 3 | Status: SHIPPED | OUTPATIENT
Start: 2022-08-22 | End: 2024-01-09

## 2022-08-22 RX ORDER — ROPINIROLE 0.5 MG/1
1 TABLET, FILM COATED ORAL DAILY
COMMUNITY
Start: 2022-01-19 | End: 2024-01-09

## 2022-08-22 ASSESSMENT — ANXIETY QUESTIONNAIRES
GAD7 TOTAL SCORE: 9
3. WORRYING TOO MUCH ABOUT DIFFERENT THINGS: MORE THAN HALF THE DAYS
2. NOT BEING ABLE TO STOP OR CONTROL WORRYING: SEVERAL DAYS
6. BECOMING EASILY ANNOYED OR IRRITABLE: SEVERAL DAYS
7. FEELING AFRAID AS IF SOMETHING AWFUL MIGHT HAPPEN: MORE THAN HALF THE DAYS
GAD7 TOTAL SCORE: 9
5. BEING SO RESTLESS THAT IT IS HARD TO SIT STILL: SEVERAL DAYS
1. FEELING NERVOUS, ANXIOUS, OR ON EDGE: SEVERAL DAYS
IF YOU CHECKED OFF ANY PROBLEMS ON THIS QUESTIONNAIRE, HOW DIFFICULT HAVE THESE PROBLEMS MADE IT FOR YOU TO DO YOUR WORK, TAKE CARE OF THINGS AT HOME, OR GET ALONG WITH OTHER PEOPLE: SOMEWHAT DIFFICULT

## 2022-08-22 ASSESSMENT — PATIENT HEALTH QUESTIONNAIRE - PHQ9: 5. POOR APPETITE OR OVEREATING: SEVERAL DAYS

## 2022-08-22 ASSESSMENT — PAIN SCALES - GENERAL: PAINLEVEL: NO PAIN (0)

## 2022-08-22 NOTE — PROGRESS NOTES
"  Assessment & Plan     1. Attention deficit hyperactivity disorder (ADHD), unspecified ADHD type    2. Encounter for long-term (current) use of medications    3. JOSEPH (generalized anxiety disorder)    4. Advance care planning        1,2) ADHD under good control. Med renewed, no change. Routine UDS in process. Follow up e-visit in 6 months.    3) JOSEPH above goal, but patient states he's functioning well. Continue Prozac, no changes. Med renewed.       Ordering of each unique test  Prescription drug management         BMI:   Estimated body mass index is 30.27 kg/m  as calculated from the following:    Height as of this encounter: 1.815 m (5' 11.46\").    Weight as of this encounter: 99.7 kg (219 lb 12.8 oz).       Return in about 6 months (around 2/22/2023) for ADHD follow up, an e-visit through [facility].    Marilyn Merchant PA-C  [facility] Miller is a 22 year old, presenting for the following health issues:  Recheck Medication      History of Present Illness       Reason for visit:  Med refill for ADHD    He eats 2-3 servings of fruits and vegetables daily.He consumes 0 sweetened beverage(s) daily.He exercises with enough effort to increase his heart rate 30 to 60 minutes per day.  He exercises with enough effort to increase his heart rate 5 days per week.   He is taking medications regularly.       Medication Followup of amphetamine-dextroamphetamine (ADDERALL) 30 MG tablet    Taking Medication as prescribed: yes    Side Effects:  None    Medication Helping Symptoms:  yes    Please answer the questions below, rating yourself on each of the criteria shown using the scale on the right side of the page. As you answer each question, place an X in the box that best describes how you have felt and conducted yourself over the past 6 months.     Never Rarely Sometimes Often Very Often   1 How often do you have trouble wrapping up the final details of a project once the challenging " parts have been done?  x      2 How often do you have difficulty getting things in order when you have to do a task that requires organization?  x      3 How often do you have problems remembering appointments or obligations?  x      4 When you have a task that requires a lot of thought, how often do you avoid or delay getting started?   x     5 How often do you fidget or squirm with your hands or feet when you have to sit down for a long time?     x   6 How often do you feel overly active and compelled to do things, like you were driven by a motor?   x     7 How often do you make careless mistakes when you have to work on a boring or difficult project?  x      8 How often do you have difficulty keeping your attention when you are doing boring or repetitive work?     x   9 How often do you have difficulty concentrating on what people say to you, even when they are speaking to you directly?    x    10 How often do you misplace or have difficulty finding things at home or at work?  x      11 How often are you distracted by activity or noise around you?    x    12 How often do you leave your seat in meetings or other situations in which you are expected to remain seated?   x     13 How often do you feel restless or fidgety?  x      14 How often do you have difficulty unwinding and relaxing when you have time to yourself?   x     15 How often do you find yourself talking too much when you are in social situations?  x      16 When you're in a conversation, how often do you find yourself finishing the sentences of the people you are talking to, before they can finish them themselves?  x      17 How often do you have difficulty waiting your turn in situations when turn-taking is required?  x      18 How often do you interrupt others when they are busy?  x          Anxiety Follow-Up    How are you doing with your anxiety since your last visit? Good    Are you having other symptoms that might be associated with anxiety?  "No    Have you had a significant life event? No     Are you feeling depressed? No    Do you have any concerns with your use of alcohol or other drugs? No    Social History     Tobacco Use     Smoking status: Never Smoker     Smokeless tobacco: Never Used   Vaping Use     Vaping Use: Some days     Substances: Nicotine, Flavoring     Devices: Disposable   Substance Use Topics     Alcohol use: Yes     Comment: social     Drug use: No     JOSEPH-7 SCORE 7/7/2021 12/30/2021 8/22/2022   Total Score 6 4 9     PHQ 12/30/2021   PHQ-9 Total Score 4   Q9: Thoughts of better off dead/self-harm past 2 weeks Not at all         Review of Systems   Constitutional, and psych systems are negative, except as otherwise noted.      Objective    /79 (BP Location: Left arm, Patient Position: Sitting, Cuff Size: Adult Large)   Pulse 58   Temp 96.9  F (36.1  C) (Tympanic)   Resp 20   Ht 1.815 m (5' 11.46\")   Wt 99.7 kg (219 lb 12.8 oz)   SpO2 99%   BMI 30.27 kg/m    Body mass index is 30.27 kg/m .  Physical Exam   GENERAL: healthy, alert and no distress  MS: no gross musculoskeletal defects noted, no edema  SKIN: no suspicious lesions or rashes  NEURO: Normal strength and tone, mentation intact and speech normal  PSYCH: mentation appears normal, affect normal/bright              .  ..  "

## 2023-03-07 ENCOUNTER — VIRTUAL VISIT (OUTPATIENT)
Dept: FAMILY MEDICINE | Facility: CLINIC | Age: 23
End: 2023-03-07
Payer: COMMERCIAL

## 2023-03-07 DIAGNOSIS — F90.9 ATTENTION DEFICIT HYPERACTIVITY DISORDER (ADHD), UNSPECIFIED ADHD TYPE: ICD-10-CM

## 2023-03-07 PROCEDURE — 99213 OFFICE O/P EST LOW 20 MIN: CPT | Mod: VID | Performed by: PHYSICIAN ASSISTANT

## 2023-03-07 RX ORDER — DEXTROAMPHETAMINE SACCHARATE, AMPHETAMINE ASPARTATE, DEXTROAMPHETAMINE SULFATE AND AMPHETAMINE SULFATE 7.5; 7.5; 7.5; 7.5 MG/1; MG/1; MG/1; MG/1
15 TABLET ORAL 3 TIMES DAILY PRN
Qty: 105 TABLET | Refills: 0 | Status: SHIPPED | OUTPATIENT
Start: 2023-03-07 | End: 2023-03-09

## 2023-03-07 NOTE — PROGRESS NOTES
"Paul is a 23 year old who is being evaluated via a billable video visit.      How would you like to obtain your AVS? MyChart  If the video visit is dropped, the invitation should be resent by: Text to cell phone: [phone]  Will anyone else be joining your video visit? No          Assessment & Plan       ICD-10-CM    1. Attention deficit hyperactivity disorder (ADHD), unspecified ADHD type  F90.9 amphetamine-dextroamphetamine (ADDERALL) 30 MG tablet          ADHD stable, under good control. Graduates in May. Med renewed, no changes.       Prescription drug management         BMI:   Estimated body mass index is 30.27 kg/m  as calculated from the following:    Height as of 8/22/22: 1.815 m (5' 11.46\").    Weight as of 8/22/22: 99.7 kg (219 lb 12.8 oz).       Return in about 6 months (around 9/7/2023) for ADHD follow up, with Marilyn, in person.    Marilyn Merchant PA-C  [facility] JU[facility] Miller is a 23 year old, presenting for the following health issues:  Recheck Medication      History of Present Illness       Reason for visit:  Medication check-up    He eats 2-3 servings of fruits and vegetables daily.He consumes 2 sweetened beverage(s) daily.He exercises with enough effort to increase his heart rate 30 to 60 minutes per day.  He exercises with enough effort to increase his heart rate 4 days per week. He is missing 3 dose(s) of medications per week.  He is not taking prescribed medications regularly due to other.       Medication Followup of amphetamine-dextroamphetamine (ADDERALL) 30 MG tablet    Taking Medication as prescribed: yes    Side Effects:  None    Medication Helping Symptoms:  yes    Please answer the questions below, rating yourself on each of the criteria shown using the scale on the right side of the page. As you answer each question, place an X in the box that best describes how you have felt and conducted yourself over the past 6 months.     Never Rarely Sometimes " Often Very Often   1 How often do you have trouble wrapping up the final details of a project once the challenging parts have been done?  x      2 How often do you have difficulty getting things in order when you have to do a task that requires organization?  x      3 How often do you have problems remembering appointments or obligations? x       4 When you have a task that requires a lot of thought, how often do you avoid or delay getting started?   x     5 How often do you fidget or squirm with your hands or feet when you have to sit down for a long time?    x    6 How often do you feel overly active and compelled to do things, like you were driven by a motor?  x      7 How often do you make careless mistakes when you have to work on a boring or difficult project?  x      8 How often do you have difficulty keeping your attention when you are doing boring or repetitive work?   x     9 How often do you have difficulty concentrating on what people say to you, even when they are speaking to you directly?   x     10 How often do you misplace or have difficulty finding things at home or at work?  x      11 How often are you distracted by activity or noise around you?   x     12 How often do you leave your seat in meetings or other situations in which you are expected to remain seated?  x      13 How often do you feel restless or fidgety?   x     14 How often do you have difficulty unwinding and relaxing when you have time to yourself?   x     15 How often do you find yourself talking too much when you are in social situations?  x      16 When you're in a conversation, how often do you find yourself finishing the sentences of the people you are talking to, before they can finish them themselves?  x      17 How often do you have difficulty waiting your turn in situations when turn-taking is required?  x      18 How often do you interrupt others when they are busy?  x            Review of Systems   Constitutional, and  psych systems are negative, except as otherwise noted.      Objective           Vitals:  No vitals were obtained today due to virtual visit.    Physical Exam   GENERAL: Healthy, alert and no distress  EYES: Eyes grossly normal to inspection.  No discharge or erythema, or obvious scleral/conjunctival abnormalities.  RESP: No audible wheeze, cough, or visible cyanosis.  No visible retractions or increased work of breathing.    SKIN: Visible skin clear. No significant rash, abnormal pigmentation or lesions.  NEURO: Cranial nerves grossly intact.  Mentation and speech appropriate for age.  PSYCH: Mentation appears normal, affect normal/bright, judgement and insight intact, normal speech and appearance well-groomed.          Video-Visit Details    Type of service:  Video Visit     Originating Location (pt. Location): Home    Distant Location (provider location):  On-site  Platform used for Video Visit: [employer]

## 2023-03-08 ENCOUNTER — TELEPHONE (OUTPATIENT)
Dept: FAMILY MEDICINE | Facility: CLINIC | Age: 23
End: 2023-03-08
Payer: COMMERCIAL

## 2023-03-08 DIAGNOSIS — F90.9 ATTENTION DEFICIT HYPERACTIVITY DISORDER (ADHD), UNSPECIFIED ADHD TYPE: ICD-10-CM

## 2023-03-08 NOTE — TELEPHONE ENCOUNTER
"Per pharmacy, \"Insurance will only cover amphetamine-dextroamphetamine (ADDERALL) 30 MG tablet 30 or 90 days. Please resend as #45 or #[phone].\"  "

## 2023-03-09 ENCOUNTER — MYC MEDICAL ADVICE (OUTPATIENT)
Dept: FAMILY MEDICINE | Facility: CLINIC | Age: 23
End: 2023-03-09
Payer: COMMERCIAL

## 2023-03-09 RX ORDER — DEXTROAMPHETAMINE SACCHARATE, AMPHETAMINE ASPARTATE, DEXTROAMPHETAMINE SULFATE AND AMPHETAMINE SULFATE 7.5; 7.5; 7.5; 7.5 MG/1; MG/1; MG/1; MG/1
15 TABLET ORAL 3 TIMES DAILY PRN
Qty: 135 TABLET | Refills: 0 | Status: SHIPPED | OUTPATIENT
Start: 2023-03-09 | End: 2023-04-21

## 2023-03-10 ENCOUNTER — TELEPHONE (OUTPATIENT)
Dept: FAMILY MEDICINE | Facility: CLINIC | Age: 23
End: 2023-03-10
Payer: COMMERCIAL

## 2023-03-14 NOTE — TELEPHONE ENCOUNTER
Central Prior Authorization Team  Phone: [phone]    PA Initiation    Medication: amphetamine-dextroamphetamine (ADDERALL) 30 MG tablet  Insurance Company: [employer] - Phone [phone] Fax [phone]  Pharm[facility] Filling the Rx[id number] [place], [place] - [address]  Filling Pharmacy Phone: [phone]  Filling Pharmacy Fax:    Start Date: 3/14/2023      
Prior Authorization Approval    Authorization Effective Date: 3/14/2023  Authorization Expiration Date: 3/14/2024  Medication: amphetamine-dextroamphetamine (ADDERALL) 30 MG tablet- APPROVED  Approved Dose/Quantity:   Reference #:     Insurance Company: [employer] - Phone [phone] Fax [phone]  Expected CoPay:       CoPay Card Available:      Foundation Assistance Needed:    Which Pharmacy is filling the prescription (Not needed for infusion/clinic administered): [id number] [place], [place] - [address]  Pharmacy Notified: Yes  Patient Notified:  **Instructed pharmacy to notify patient when script is ready to /ship.**    
Prior Authorization Retail Medication Request    Medication/Dose: amphetamine-dextroamphetamine (ADDERALL) 30 MG tablet  ICD code (if different than what is on RX):  F90.9  Previously Tried and Failed:  na  Rationale:  na    Insurance Name:  [facility]  Insurance ID:  [id number]      Pharmacy Information (if different than what is on RX)  [facility]  [facility]:  [phone]      
79.4

## 2023-03-17 ENCOUNTER — MYC MEDICAL ADVICE (OUTPATIENT)
Dept: FAMILY MEDICINE | Facility: CLINIC | Age: 23
End: 2023-03-17
Payer: COMMERCIAL

## 2023-03-17 DIAGNOSIS — F90.9 ATTENTION DEFICIT HYPERACTIVITY DISORDER (ADHD), UNSPECIFIED ADHD TYPE: Primary | ICD-10-CM

## 2023-03-20 RX ORDER — DEXTROAMPHETAMINE SACCHARATE, AMPHETAMINE ASPARTATE, DEXTROAMPHETAMINE SULFATE AND AMPHETAMINE SULFATE 3.75; 3.75; 3.75; 3.75 MG/1; MG/1; MG/1; MG/1
15 TABLET ORAL 3 TIMES DAILY
Qty: 270 TABLET | Refills: 0 | Status: SHIPPED | OUTPATIENT
Start: 2023-03-20 | End: 2023-04-21

## 2023-03-20 NOTE — TELEPHONE ENCOUNTER
Called [facility] pharmacy to confirm that Adderall prescription was discontinued per Marilyn Galvin.    Anya La RN  [facility]

## 2023-04-23 ENCOUNTER — HEALTH MAINTENANCE LETTER (OUTPATIENT)
Age: 23
End: 2023-04-23

## 2023-10-18 ENCOUNTER — MYC REFILL (OUTPATIENT)
Dept: FAMILY MEDICINE | Facility: CLINIC | Age: 23
End: 2023-10-18
Payer: COMMERCIAL

## 2023-10-18 DIAGNOSIS — F90.9 ATTENTION DEFICIT HYPERACTIVITY DISORDER (ADHD), UNSPECIFIED ADHD TYPE: ICD-10-CM

## 2023-10-20 RX ORDER — DEXTROAMPHETAMINE SACCHARATE, AMPHETAMINE ASPARTATE, DEXTROAMPHETAMINE SULFATE AND AMPHETAMINE SULFATE 3.75; 3.75; 3.75; 3.75 MG/1; MG/1; MG/1; MG/1
15 TABLET ORAL 3 TIMES DAILY
Qty: 90 TABLET | Refills: 0 | Status: SHIPPED | OUTPATIENT
Start: 2023-10-20 | End: 2024-01-09

## 2024-01-08 ASSESSMENT — ENCOUNTER SYMPTOMS
WEAKNESS: 0
PARESTHESIAS: 0
FREQUENCY: 0
ABDOMINAL PAIN: 0
HEMATOCHEZIA: 0
COUGH: 0
FEVER: 0
CHILLS: 0
JOINT SWELLING: 0
SORE THROAT: 0
MYALGIAS: 0
HEARTBURN: 0
EYE PAIN: 0
HEMATURIA: 0
NERVOUS/ANXIOUS: 0
ARTHRALGIAS: 0
DYSURIA: 0
NAUSEA: 0
CONSTIPATION: 0
DIARRHEA: 0
DIZZINESS: 0
SHORTNESS OF BREATH: 0
HEADACHES: 0
PALPITATIONS: 0

## 2024-01-08 ASSESSMENT — ANXIETY QUESTIONNAIRES
3. WORRYING TOO MUCH ABOUT DIFFERENT THINGS: NOT AT ALL
IF YOU CHECKED OFF ANY PROBLEMS ON THIS QUESTIONNAIRE, HOW DIFFICULT HAVE THESE PROBLEMS MADE IT FOR YOU TO DO YOUR WORK, TAKE CARE OF THINGS AT HOME, OR GET ALONG WITH OTHER PEOPLE: NOT DIFFICULT AT ALL
8. IF YOU CHECKED OFF ANY PROBLEMS, HOW DIFFICULT HAVE THESE MADE IT FOR YOU TO DO YOUR WORK, TAKE CARE OF THINGS AT HOME, OR GET ALONG WITH OTHER PEOPLE?: NOT DIFFICULT AT ALL
4. TROUBLE RELAXING: SEVERAL DAYS
6. BECOMING EASILY ANNOYED OR IRRITABLE: SEVERAL DAYS
7. FEELING AFRAID AS IF SOMETHING AWFUL MIGHT HAPPEN: NOT AT ALL
1. FEELING NERVOUS, ANXIOUS, OR ON EDGE: NOT AT ALL
GAD7 TOTAL SCORE: 3
GAD7 TOTAL SCORE: 3
2. NOT BEING ABLE TO STOP OR CONTROL WORRYING: NOT AT ALL
5. BEING SO RESTLESS THAT IT IS HARD TO SIT STILL: SEVERAL DAYS
7. FEELING AFRAID AS IF SOMETHING AWFUL MIGHT HAPPEN: NOT AT ALL

## 2024-01-09 ENCOUNTER — OFFICE VISIT (OUTPATIENT)
Dept: FAMILY MEDICINE | Facility: CLINIC | Age: 24
End: 2024-01-09
Payer: COMMERCIAL

## 2024-01-09 VITALS
WEIGHT: 207 LBS | HEART RATE: 59 BPM | OXYGEN SATURATION: 99 % | TEMPERATURE: 97.7 F | BODY MASS INDEX: 28.98 KG/M2 | SYSTOLIC BLOOD PRESSURE: 118 MMHG | HEIGHT: 71 IN | RESPIRATION RATE: 16 BRPM | DIASTOLIC BLOOD PRESSURE: 76 MMHG

## 2024-01-09 DIAGNOSIS — Z00.00 ROUTINE GENERAL MEDICAL EXAMINATION AT A HEALTH CARE FACILITY: Primary | ICD-10-CM

## 2024-01-09 DIAGNOSIS — F90.9 ATTENTION DEFICIT HYPERACTIVITY DISORDER (ADHD), UNSPECIFIED ADHD TYPE: ICD-10-CM

## 2024-01-09 PROBLEM — F41.1 GAD (GENERALIZED ANXIETY DISORDER): Status: RESOLVED | Noted: 2021-01-20 | Resolved: 2024-01-09

## 2024-01-09 PROCEDURE — 99395 PREV VISIT EST AGE 18-39: CPT | Performed by: PHYSICIAN ASSISTANT

## 2024-01-09 RX ORDER — DEXTROAMPHETAMINE SACCHARATE, AMPHETAMINE ASPARTATE, DEXTROAMPHETAMINE SULFATE AND AMPHETAMINE SULFATE 3.75; 3.75; 3.75; 3.75 MG/1; MG/1; MG/1; MG/1
15 TABLET ORAL DAILY PRN
Qty: 30 TABLET
Start: 2024-01-09

## 2024-01-09 ASSESSMENT — ENCOUNTER SYMPTOMS
WEAKNESS: 0
FEVER: 0
FREQUENCY: 0
CHILLS: 0
PARESTHESIAS: 0
HEMATOCHEZIA: 0
CONSTIPATION: 0
JOINT SWELLING: 0
HEADACHES: 0
DIZZINESS: 0
SORE THROAT: 0
SHORTNESS OF BREATH: 0
EYE PAIN: 0
NAUSEA: 0
DYSURIA: 0
HEARTBURN: 0
ABDOMINAL PAIN: 0
COUGH: 0
MYALGIAS: 0
DIARRHEA: 0
NERVOUS/ANXIOUS: 0
HEMATURIA: 0
PALPITATIONS: 0
ARTHRALGIAS: 0

## 2024-01-09 NOTE — PROGRESS NOTES
SUBJECTIVE:   Paul is a 23 year old, presenting for the following:  Physical        1/9/2024     6:57 AM   Additional Questions   Roomed by Jennifer   Accompanied by Self         1/9/2024     6:57 AM   Patient Reported Additional Medications   Patient reports taking the following new medications na       Healthy Habits:     Getting at least 3 servings of Calcium per day:  Yes    Bi-annual eye exam:  NO    Dental care twice a year:  NO    Sleep apnea or symptoms of sleep apnea:  Daytime drowsiness    Diet:  Regular (no restrictions)    Frequency of exercise:  2-3 days/week    Duration of exercise:  N/A    Taking medications regularly:  Yes    Medication side effects:  None    Additional concerns today:  No    Patient with history of ADHD and anxiety arrived for Annual Physical + Medication renewal. GAD7 completed online, ADHD questionnaire given to pt in room for completion.     Patient is not fasting for lab work.     Medication Followup of Adderall  Taking Medication as prescribed: yes  Side Effects:  None  Medication Helping Symptoms:  yes    Please answer the questions below, rating yourself on each of the criteria shown using the scale on the right side of the page. As you answer each question, place an X in the box that best describes how you have felt and conducted yourself over the past 6 months.     Never Rarely Sometimes Often Very Often   1 How often do you have trouble wrapping up the final details of a project once the challenging parts have been done?  x      2 How often do you have difficulty getting things in order when you have to do a task that requires organization?  x      3 How often do you have problems remembering appointments or obligations?  x      4 When you have a task that requires a lot of thought, how often do you avoid or delay getting started?  x      5 How often do you fidget or squirm with your hands or feet when you have to sit down for a long time?    x    6 How often do you feel  overly active and compelled to do things, like you were driven by a motor?  x      7 How often do you make careless mistakes when you have to work on a boring or difficult project?  x      8 How often do you have difficulty keeping your attention when you are doing boring or repetitive work?    x    9 How often do you have difficulty concentrating on what people say to you, even when they are speaking to you directly?   x     10 How often do you misplace or have difficulty finding things at home or at work?  x      11 How often are you distracted by activity or noise around you?    x    12 How often do you leave your seat in meetings or other situations in which you are expected to remain seated?  x      13 How often do you feel restless or fidgety?   x     14 How often do you have difficulty unwinding and relaxing when you have time to yourself?   x     15 How often do you find yourself talking too much when you are in social situations?  x      16 When you're in a conversation, how often do you find yourself finishing the sentences of the people you are talking to, before they can finish them themselves?  x      17 How often do you have difficulty waiting your turn in situations when turn-taking is required?  x      18 How often do you interrupt others when they are busy?  x           Only using Adderall once daily, 1-2x per week  Mostly needs it when he needs to do computer work    Anxiety Follow-Up  How are you doing with your anxiety since your last visit? Improved, no longer taking Prozac   Are you having other symptoms that might be associated with anxiety? No  Have you had a significant life event? No   Are you feeling depressed? No  Do you have any concerns with your use of alcohol or other drugs? No    Under good control without medication     Social History     Tobacco Use    Smoking status: Never    Smokeless tobacco: Never   Vaping Use    Vaping Use: Some days    Substances: Nicotine, Flavoring     "Devices: Disposable   Substance Use Topics    Alcohol use: Yes     Comment: social    Drug use: No         12/30/2021     7:34 AM 8/22/2022     9:54 AM 1/8/2024     9:50 PM   JOSEPH-7 SCORE   Total Score   3 (minimal anxiety)   Total Score 4 9 3         12/30/2021     7:34 AM   PHQ   PHQ-9 Total Score 4   Q9: Thoughts of better off dead/self-harm past 2 weeks Not at all       Social History     Tobacco Use    Smoking status: Never    Smokeless tobacco: Never   Substance Use Topics    Alcohol use: Yes     Comment: social             1/8/2024     9:58 PM   Alcohol Use   Prescreen: >3 drinks/day or >7 drinks/week? No     Last PSA: No results found for: \"PSA\"    Reviewed orders with patient. Reviewed health maintenance and updated orders accordingly - Yes  Lab work is in process  Labs reviewed in EPIC    Reviewed and updated as needed this visit by clinical staff   Tobacco  Allergies  Meds              Reviewed and updated as needed this visit by Provider                 Past Medical History:   Diagnosis Date    Pneumonia 6/09    HOSP.2 DAYS        Review of Systems   Constitutional:  Negative for chills and fever.   HENT:  Negative for congestion, ear pain, hearing loss and sore throat.    Eyes:  Negative for pain and visual disturbance.   Respiratory:  Negative for cough and shortness of breath.    Cardiovascular:  Negative for chest pain, palpitations and peripheral edema.   Gastrointestinal:  Negative for abdominal pain, constipation, diarrhea, heartburn, hematochezia and nausea.   Genitourinary:  Negative for dysuria, frequency, genital sores, hematuria, impotence, penile discharge and urgency.   Musculoskeletal:  Negative for arthralgias, joint swelling and myalgias.   Skin:  Negative for rash.   Neurological:  Negative for dizziness, weakness, headaches and paresthesias.   Psychiatric/Behavioral:  Negative for mood changes. The patient is not nervous/anxious.        OBJECTIVE:   /76 (BP Location: Left arm, " "Patient Position: Chair, Cuff Size: Adult Large)   Pulse 59   Temp 97.7  F (36.5  C) (Tympanic)   Resp 16   Ht 1.805 m (5' 11.06\")   Wt 93.9 kg (207 lb)   SpO2 99%   BMI 28.82 kg/m      Physical Exam  GENERAL: healthy, alert and no distress  EYES: Eyes grossly normal to inspection  HENT: ear canals and TM's normal, nose and mouth without ulcers or lesions  NECK: no adenopathy, no asymmetry, masses, or scars and thyroid normal to palpation  RESP: lungs clear to auscultation - no rales, rhonchi or wheezes  CV: regular rates and rhythm, normal S1 S2, no S3 or S4, and no murmur, click or rub  ABDOMEN: soft, nontender, no hepatosplenomegaly, no masses and bowel sounds normal  MS: no gross musculoskeletal defects noted, no edema  SKIN: no suspicious lesions or rashes  NEURO: Normal strength and tone, mentation intact and speech normal  PSYCH: mentation appears normal, affect normal/bright    ASSESSMENT/PLAN:       ICD-10-CM    1. Routine general medical examination at a health care facility  Z00.00 Adult Eye  Referral      2. Attention deficit hyperactivity disorder (ADHD), unspecified ADHD type  F90.9 [id number] - Urine Drug Confirmation Panel (Comprehensive)     amphetamine-dextroamphetamine (ADDERALL) 15 MG tablet          1) Screenings discussed. Lipids/glucose in the next few years    2) He has a supply at home, no refill needed today. Is only using once daily, 1-2x per week. Routine UDS. Follow up e-visit in 6 months      COUNSELING:   Reviewed preventive health counseling, as reflected in patient instructions    He reports that he has never smoked. He has never used smokeless tobacco.            Marilyn Merchant PA-C  [facility]    Answers submitted by the patient for this visit:  JOSEPH-7 (Submitted on 1/8/2024)  JOSEPH 7 TOTAL SCORE: 3    "

## 2024-12-10 ENCOUNTER — PATIENT OUTREACH (OUTPATIENT)
Dept: CARE COORDINATION | Facility: CLINIC | Age: 24
End: 2024-12-10
Payer: COMMERCIAL

## 2025-01-02 ENCOUNTER — PATIENT OUTREACH (OUTPATIENT)
Dept: CARE COORDINATION | Facility: CLINIC | Age: 25
End: 2025-01-02
Payer: COMMERCIAL